# Patient Record
Sex: MALE | Race: WHITE | Employment: FULL TIME | URBAN - NONMETROPOLITAN AREA
[De-identification: names, ages, dates, MRNs, and addresses within clinical notes are randomized per-mention and may not be internally consistent; named-entity substitution may affect disease eponyms.]

---

## 2017-05-16 ENCOUNTER — OFFICE VISIT (OUTPATIENT)
Dept: CARDIOLOGY | Age: 42
End: 2017-05-16

## 2017-05-16 VITALS
SYSTOLIC BLOOD PRESSURE: 140 MMHG | HEIGHT: 71 IN | WEIGHT: 309 LBS | DIASTOLIC BLOOD PRESSURE: 90 MMHG | HEART RATE: 64 BPM | BODY MASS INDEX: 43.26 KG/M2

## 2017-05-16 DIAGNOSIS — R94.31 ABNORMAL ECG: ICD-10-CM

## 2017-05-16 PROCEDURE — 99204 OFFICE O/P NEW MOD 45 MIN: CPT | Performed by: INTERNAL MEDICINE

## 2017-05-16 RX ORDER — LISINOPRIL 5 MG/1
5 TABLET ORAL DAILY
COMMUNITY
End: 2020-07-04

## 2017-05-16 RX ORDER — LISINOPRIL AND HYDROCHLOROTHIAZIDE 25; 20 MG/1; MG/1
1 TABLET ORAL DAILY
Qty: 30 TABLET | Refills: 5 | Status: SHIPPED | OUTPATIENT
Start: 2017-05-16 | End: 2020-07-04

## 2017-05-16 RX ORDER — LISINOPRIL AND HYDROCHLOROTHIAZIDE 25; 20 MG/1; MG/1
1 TABLET ORAL DAILY
COMMUNITY
End: 2017-05-16 | Stop reason: SDUPTHER

## 2017-05-16 ASSESSMENT — ENCOUNTER SYMPTOMS
EYE DISCHARGE: 0
BLOOD IN STOOL: 0
CHOKING: 0
WHEEZING: 0
APNEA: 0
SHORTNESS OF BREATH: 0
NAUSEA: 0
COLOR CHANGE: 0
CHEST TIGHTNESS: 0
COUGH: 0
ABDOMINAL PAIN: 0
VOICE CHANGE: 0

## 2020-07-04 ENCOUNTER — HOSPITAL ENCOUNTER (EMERGENCY)
Age: 45
Discharge: HOME | End: 2020-07-04
Attending: EMERGENCY MEDICINE
Payer: COMMERCIAL

## 2020-07-04 ENCOUNTER — APPOINTMENT (OUTPATIENT)
Dept: CT IMAGING | Age: 45
End: 2020-07-04
Payer: COMMERCIAL

## 2020-07-04 VITALS
RESPIRATION RATE: 17 BRPM | WEIGHT: 300 LBS | DIASTOLIC BLOOD PRESSURE: 79 MMHG | HEART RATE: 64 BPM | BODY MASS INDEX: 40.63 KG/M2 | SYSTOLIC BLOOD PRESSURE: 126 MMHG | OXYGEN SATURATION: 98 % | HEIGHT: 72 IN | TEMPERATURE: 98.7 F

## 2020-07-04 LAB
ALBUMIN SERPL-MCNC: 3.9 G/DL (ref 3.5–5.1)
ALP BLD-CCNC: 43 U/L (ref 38–126)
ALT SERPL-CCNC: 24 U/L (ref 11–66)
ANION GAP SERPL CALCULATED.3IONS-SCNC: 11 MEQ/L (ref 8–16)
AST SERPL-CCNC: 26 U/L (ref 5–40)
BASOPHILS # BLD: 0.9 %
BASOPHILS ABSOLUTE: 0.1 THOU/MM3 (ref 0–0.1)
BILIRUB SERPL-MCNC: 0.8 MG/DL (ref 0.3–1.2)
BILIRUBIN URINE: NEGATIVE
BLOOD, URINE: NEGATIVE
BUN BLDV-MCNC: 18 MG/DL (ref 7–22)
CALCIUM SERPL-MCNC: 8.7 MG/DL (ref 8.5–10.5)
CHARACTER, URINE: CLEAR
CHLORIDE BLD-SCNC: 103 MEQ/L (ref 98–111)
CO2: 24 MEQ/L (ref 23–33)
COLOR: YELLOW
CREAT SERPL-MCNC: 1.1 MG/DL (ref 0.4–1.2)
EKG ATRIAL RATE: 64 BPM
EKG P AXIS: 52 DEGREES
EKG P-R INTERVAL: 134 MS
EKG Q-T INTERVAL: 428 MS
EKG QRS DURATION: 86 MS
EKG QTC CALCULATION (BAZETT): 441 MS
EKG T AXIS: 19 DEGREES
EKG VENTRICULAR RATE: 64 BPM
EOSINOPHIL # BLD: 3.9 %
EOSINOPHILS ABSOLUTE: 0.3 THOU/MM3 (ref 0–0.4)
ERYTHROCYTE [DISTWIDTH] IN BLOOD BY AUTOMATED COUNT: 13.8 % (ref 11.5–14.5)
ERYTHROCYTE [DISTWIDTH] IN BLOOD BY AUTOMATED COUNT: 46.4 FL (ref 35–45)
GFR SERPL CREATININE-BSD FRML MDRD: 72 ML/MIN/1.73M2
GLUCOSE BLD-MCNC: 126 MG/DL (ref 70–108)
GLUCOSE URINE: NEGATIVE MG/DL
HCT VFR BLD CALC: 45.4 % (ref 42–52)
HEMOGLOBIN: 14.5 GM/DL (ref 14–18)
IMMATURE GRANS (ABS): 0.05 THOU/MM3 (ref 0–0.07)
IMMATURE GRANULOCYTES: 0.6 %
KETONES, URINE: NEGATIVE
LEUKOCYTE ESTERASE, URINE: NEGATIVE
LYMPHOCYTES # BLD: 34.5 %
LYMPHOCYTES ABSOLUTE: 2.8 THOU/MM3 (ref 1–4.8)
MCH RBC QN AUTO: 29.4 PG (ref 26–33)
MCHC RBC AUTO-ENTMCNC: 31.9 GM/DL (ref 32.2–35.5)
MCV RBC AUTO: 91.9 FL (ref 80–94)
MONOCYTES # BLD: 11.8 %
MONOCYTES ABSOLUTE: 1 THOU/MM3 (ref 0.4–1.3)
NITRITE, URINE: NEGATIVE
NUCLEATED RED BLOOD CELLS: 0 /100 WBC
OSMOLALITY CALCULATION: 279.1 MOSMOL/KG (ref 275–300)
PH UA: 5 (ref 5–9)
PLATELET # BLD: 256 THOU/MM3 (ref 130–400)
PMV BLD AUTO: 10 FL (ref 9.4–12.4)
POTASSIUM REFLEX MAGNESIUM: 4.3 MEQ/L (ref 3.5–5.2)
PROTEIN UA: NEGATIVE
RBC # BLD: 4.94 MILL/MM3 (ref 4.7–6.1)
SEG NEUTROPHILS: 48.3 %
SEGMENTED NEUTROPHILS ABSOLUTE COUNT: 4 THOU/MM3 (ref 1.8–7.7)
SODIUM BLD-SCNC: 138 MEQ/L (ref 135–145)
SPECIFIC GRAVITY, URINE: 1.03 (ref 1–1.03)
TOTAL PROTEIN: 6.9 G/DL (ref 6.1–8)
TROPONIN T: < 0.01 NG/ML
UROBILINOGEN, URINE: 0.2 EU/DL (ref 0–1)
WBC # BLD: 8.2 THOU/MM3 (ref 4.8–10.8)

## 2020-07-04 PROCEDURE — 93010 ELECTROCARDIOGRAM REPORT: CPT | Performed by: NUCLEAR MEDICINE

## 2020-07-04 PROCEDURE — 36415 COLL VENOUS BLD VENIPUNCTURE: CPT

## 2020-07-04 PROCEDURE — 93005 ELECTROCARDIOGRAM TRACING: CPT | Performed by: EMERGENCY MEDICINE

## 2020-07-04 PROCEDURE — 70450 CT HEAD/BRAIN W/O DYE: CPT

## 2020-07-04 PROCEDURE — 84484 ASSAY OF TROPONIN QUANT: CPT

## 2020-07-04 PROCEDURE — 81003 URINALYSIS AUTO W/O SCOPE: CPT

## 2020-07-04 PROCEDURE — 99285 EMERGENCY DEPT VISIT HI MDM: CPT

## 2020-07-04 PROCEDURE — 80053 COMPREHEN METABOLIC PANEL: CPT

## 2020-07-04 PROCEDURE — 6370000000 HC RX 637 (ALT 250 FOR IP): Performed by: EMERGENCY MEDICINE

## 2020-07-04 PROCEDURE — 85025 COMPLETE CBC W/AUTO DIFF WBC: CPT

## 2020-07-04 RX ORDER — MECLIZINE HYDROCHLORIDE 25 MG/1
25 TABLET ORAL 3 TIMES DAILY PRN
Qty: 20 TABLET | Refills: 0 | Status: SHIPPED | OUTPATIENT
Start: 2020-07-04 | End: 2020-07-14

## 2020-07-04 RX ORDER — MECLIZINE HYDROCHLORIDE CHEWABLE TABLETS 25 MG/1
25 TABLET, CHEWABLE ORAL ONCE
Status: COMPLETED | OUTPATIENT
Start: 2020-07-04 | End: 2020-07-04

## 2020-07-04 RX ADMIN — MECLIZINE HCL 25 MG: 25 TABLET, CHEWABLE ORAL at 06:14

## 2020-07-04 ASSESSMENT — ENCOUNTER SYMPTOMS
COUGH: 0
EYE REDNESS: 0
VOMITING: 0
SORE THROAT: 0
NAUSEA: 0
EYE DISCHARGE: 0
SHORTNESS OF BREATH: 0
EYE ITCHING: 0
CONSTIPATION: 0
EYE PAIN: 0
CHEST TIGHTNESS: 0
ABDOMINAL PAIN: 0
PHOTOPHOBIA: 0
DIARRHEA: 0
STRIDOR: 0
WHEEZING: 0
BACK PAIN: 0
RHINORRHEA: 0
ABDOMINAL DISTENTION: 0

## 2020-07-04 NOTE — ED NOTES
Pt medicated for dizziness per MAR . Pt VSS. Pt denies further needs. Call light in reach, will continue to monitor.       Reji Redd RN  07/04/20 [id number]

## 2020-07-04 NOTE — ED TRIAGE NOTES
Pt to ED via intake with c/o of dizziness that began at 330 this AM. Pt reports they also have a \"discomfort\" in their chest. Pt denies feeling SOB. Pt VSS. Pt is A&Ox4. Pt denies further complaints. EKG completed. Tele applied. IV inserted. Will continue to monitor.

## 2020-07-04 NOTE — ED NOTES
Pt urine collected, labeled and sent lab. Pt traveling to CT at this time.       Maverick Slater RN  07/04/20 0917

## 2020-07-04 NOTE — ED PROVIDER NOTES
[facility] COMPLAINT       Chief Complaint   Patient presents with    Dizziness       Nurses Notes reviewed and I agreeexcept as noted in the HPI. HISTORY OF PRESENT ILLNESS    Jcarlos Calderon is a 50 y.o. male who presents to Emergency Department with Dizziness    69-year-old male with no major past medical history presents to ED complaining of sudden onset dizziness which started from 3 AM in the morning when he woke up. Patient felt whole room was spinning. This never happened before. Symptoms lasted for about 30 minutes and resolved spontaneously. Patient felt normal when he arrived in the ED. He has no headache, no neck pain. He has no fever no chills. No palpitation. No vomiting although he felt mild nausea when that happened. REVIEW OF SYSTEMS     Review of Systems   Constitutional: Negative for activity change, appetite change, chills, fatigue, fever and unexpected weight change. HENT: Negative for congestion, ear discharge, ear pain, hearing loss, nosebleeds, rhinorrhea and sore throat. Eyes: Negative for photophobia, pain, discharge, redness and itching. Respiratory: Negative for cough, chest tightness, shortness of breath, wheezing and stridor. Cardiovascular: Negative for chest pain, palpitations and leg swelling. Gastrointestinal: Negative for abdominal distention, abdominal pain, constipation, diarrhea, nausea and vomiting. Endocrine: Negative for cold intolerance, heat intolerance, polydipsia and polyphagia. Genitourinary: Negative for dysuria, flank pain, frequency and hematuria. Musculoskeletal: Negative for arthralgias, back pain, gait problem, myalgias, neck pain and neck stiffness. Skin: Negative for pallor, rash and wound. Allergic/Immunologic: Negative for environmental allergies and food allergies. Neurological: Positive for dizziness.  Negative for tremors, syncope, weakness and headaches. Psychiatric/Behavioral: Negative for agitation, behavioral problems, confusion, self-injury, sleep disturbance and suicidal ideas. PAST MEDICAL HISTORY    has a past medical history of Hypertension. SURGICAL HISTORY      has no past surgical history on file. CURRENT MEDICATIONS       Previous Medications    No medications on file       ALLERGIES     is allergic to pcn [penicillins]. FAMILY HISTORY     He indicated that the status of his mother is unknown.   family history includes Diabetes in his mother; High Blood Pressure in his mother. SOCIAL HISTORY      reports that he has never smoked. He has quit using smokeless tobacco. He reports that he does not drink alcohol or use drugs. PHYSICAL EXAM     INITIAL VITALS:  height is 6' (1.829 m) and weight is 300 lb (136.1 kg). His oral temperature is 98.7 °F (37.1 °C). His blood pressure is 133/85 and his pulse is 63. His respiration is 18 and oxygen saturation is 98%. Physical Exam  Vitals signs and nursing note reviewed. Constitutional:       Appearance: He is well-developed. He is not diaphoretic. HENT:      Head: Normocephalic and atraumatic. Nose: Nose normal.   Eyes:      General: No scleral icterus. Right eye: No discharge. Left eye: No discharge. Conjunctiva/sclera: Conjunctivae normal.      Pupils: Pupils are equal, round, and reactive to light. Neck:      Musculoskeletal: Normal range of motion and neck supple. Vascular: No JVD. Trachea: No tracheal deviation. Cardiovascular:      Rate and Rhythm: Normal rate and regular rhythm. Heart sounds: Normal heart sounds. No murmur. No friction rub. No gallop. Pulmonary:      Effort: Pulmonary effort is normal. No respiratory distress. Breath sounds: Normal breath sounds. No stridor. No wheezing or rales. Chest:      Chest wall: No tenderness. Abdominal:      General: Bowel sounds are normal. There is no distension. Palpations: Abdomen is soft. There is no mass. Tenderness: There is no abdominal tenderness. There is no guarding or rebound. Hernia: No hernia is present. Musculoskeletal:         General: No tenderness or deformity. Lymphadenopathy:      Cervical: No cervical adenopathy. Skin:     General: Skin is warm and dry. Capillary Refill: Capillary refill takes less than 2 seconds. Coloration: Skin is not pale. Findings: No erythema or rash. Neurological:      Mental Status: He is alert and oriented to person, place, and time. Cranial Nerves: No cranial nerve deficit. Sensory: No sensory deficit. Motor: No abnormal muscle tone. Coordination: Coordination normal.      Deep Tendon Reflexes: Reflexes normal.      Comments: HINTs exam negative. Psychiatric:         Behavior: Behavior normal.         Thought Content: Thought content normal.         Judgment: Judgment normal.           DIFFERENTIAL DIAGNOSIS:   BPV, vertigo, labyrinthitis, TIA    DIAGNOSTIC RESULTS     EKG: All EKG's are interpreted by the Emergency Department Physician who either signs or Co-signsthis chart in the absence of a cardiologist.  Interpreted by me  Normal sinus rhythm  Ventricular rate 64 bpm  CA interval 134 ms  QRS duration 86 ms   ms  Normal EKG    RADIOLOGY: non-plain film images(s) such as CT, Ultrasound and MRI are read by the radiologist.    [address]   Final Result    No evidence of an acute process. **This report has been created using voice recognition software. It may contain minor errors which are inherent in voice recognition technology. **      Final report electronically signed by Dr. Bibi Luo on 7/4/2020 5:27 AM          []Visualized and interpreted by me   [] Radiologist's Wet Read Report Reviewed   [] Discussed with Radiologist.    Nelda Koehler:   Results for orders placed or performed during the hospital encounter of 07/04/20   CBC auto differential Result Value Ref Range    WBC 8.2 4.8 - 10.8 thou/mm3    RBC 4.94 4.70 - 6.10 mill/mm3    Hemoglobin 14.5 14.0 - 18.0 gm/dl    Hematocrit 45.4 42.0 - 52.0 %    MCV 91.9 80.0 - 94.0 fL    MCH 29.4 26.0 - 33.0 pg    MCHC 31.9 (L) 32.2 - 35.5 gm/dl    RDW-CV 13.8 11.5 - 14.5 %    RDW-SD 46.4 (H) 35.0 - 45.0 fL    Platelets 795 744 - 704 thou/mm3    MPV 10.0 9.4 - 12.4 fL    Seg Neutrophils 48.3 %    Lymphocytes 34.5 %    Monocytes 11.8 %    Eosinophils 3.9 %    Basophils 0.9 %    Immature Granulocytes 0.6 %    Segs Absolute 4.0 1.8 - 7.7 thou/mm3    Lymphocytes Absolute 2.8 1.0 - 4.8 thou/mm3    Monocytes Absolute 1.0 0.4 - 1.3 thou/mm3    Eosinophils Absolute 0.3 0.0 - 0.4 thou/mm3    Basophils Absolute 0.1 0.0 - 0.1 thou/mm3    Immature Grans (Abs) 0.05 0.00 - 0.07 thou/mm3    nRBC 0 /100 wbc   Comprehensive Metabolic Panel w/ Reflex to MG   Result Value Ref Range    Glucose 126 (H) 70 - 108 mg/dL    CREATININE 1.1 0.4 - 1.2 mg/dL    BUN 18 7 - 22 mg/dL    Sodium 138 135 - 145 meq/L    Potassium reflex Magnesium 4.3 3.5 - 5.2 meq/L    Chloride 103 98 - 111 meq/L    CO2 24 23 - 33 meq/L    Calcium 8.7 8.5 - 10.5 mg/dL    AST 26 5 - 40 U/L    Alkaline Phosphatase 43 38 - 126 U/L    Total Protein 6.9 6.1 - 8.0 g/dL    Alb 3.9 3.5 - 5.1 g/dL    Total Bilirubin 0.8 0.3 - 1.2 mg/dL    ALT 24 11 - 66 U/L   Urine reflex to culture   Result Value Ref Range    Glucose, Ur NEGATIVE NEGATIVE mg/dl    Bilirubin Urine NEGATIVE NEGATIVE    Ketones, Urine NEGATIVE NEGATIVE    Specific Gravity, Urine 1.027 1.002 - 1.03    Blood, Urine NEGATIVE NEGATIVE    pH, UA 5.0 5.0 - 9.0    Protein, UA NEGATIVE NEGATIVE    Urobilinogen, Urine 0.2 0.0 - 1.0 eu/dl    Nitrite, Urine NEGATIVE NEGATIVE    Leukocyte Esterase, Urine NEGATIVE NEGATIVE    Color, UA YELLOW STRAW-YELL    Character, Urine CLEAR CLEAR-SL C   Troponin   Result Value Ref Range    Troponin T < 0.010 ng/ml   Anion Gap   Result Value Ref Range    Anion Gap 11.0 8.0 - 16.0 meq/L Osmolality   Result Value Ref Range    Osmolality Calc 279.1 275.0 - 300 mOsmol/kg   Glomerular Filtration Rate, Estimated   Result Value Ref Range    Est Gloelmira Filt Rate 72 (A) ml/min/1.73m2   EKG Dizziness   Result Value Ref Range    Ventricular Rate 64 BPM    Atrial Rate 64 BPM    P-R Interval 134 ms    QRS Duration 86 ms    Q-T Interval 428 ms    QTc Calculation (Bazett) 441 ms    P Axis 52 degrees    T Axis 19 degrees       EMERGENCY DEPARTMENT COURSE:   Vitals:    Vitals:    07/04/20 0406 07/04/20 0500 07/04/20 0614   BP:  137/79 133/85   Pulse: 65 67 63   Resp: 17 16 18   Temp: 98.7 °F (37.1 °C)     TempSrc: Oral     SpO2: 97% 99% 98%   Weight: 300 lb (136.1 kg)     Height: 6' (1.829 m)         4:00    Patient is seen and evaluated in a timely fashion. Action:     Large-bore IV, labs, head CT    MedicalDecision Making    Reassessment: Stable. Patient is medicated with following medications during ED stay. Medications   meclizine (ANTIVERT) chewable tablet 25 mg (25 mg Oral Given 7/4/20 0611)       Patient is asymptomatic when he arrived. EKG is normal.  History and exam are consistent with BPPV. I also discussed with him that occasionally vertebrobasilar insufficiency can cause vertigo but usually associated with nausea, swallowing difficulty and gait problem     Given the patient's age, I feel obtaining a head CT is appropriate. Head CT does not reveal acute intra-abdominal findings. Labs are reassuring. EKG is normal.     ED work-ups do not support this is central vertigo. Patient is reassured and discharged with meclizine prescribed. PCP follow-up in 1 week. CRITICAL CARE:   None    CONSULTS:  None    PROCEDURES:  None    FINAL IMPRESSION      1. Dizziness    2.  Benign paroxysmal positional vertigo, unspecified laterality          DISPOSITION/PLAN   Home    PATIENT REFERRED TO:  Arjun Barillas  [address] [place] [id number] [phone]    In 1 week  ED visit follow-up      DISCHARGE MEDICATIONS:  New Prescriptions    MECLIZINE (ANTIVERT) 25 MG TABLET    Take 1 tablet by mouth 3 times daily as needed for Dizziness       (Please note that portions of this note were completed with a voice recognition program.  Efforts were made to edit the dictations but occasionally words aremis-transcribed.)    MD Shyann He MD  07/04/20 8980

## 2021-08-19 ENCOUNTER — HOSPITAL ENCOUNTER (OUTPATIENT)
Age: 46
Setting detail: SPECIMEN
Discharge: HOME | End: 2021-08-19
Payer: COMMERCIAL

## 2021-08-20 LAB
C. TRACHOMATIS DNA ,URINE: NEGATIVE
CULTURE: NORMAL
Lab: NORMAL
N. GONORRHOEAE DNA, URINE: ABNORMAL
SOURCE: NORMAL
SPECIMEN DESCRIPTION: ABNORMAL
SPECIMEN DESCRIPTION: NORMAL
TRICHOMONAS VAGINALI, MOLECULAR: NEGATIVE

## 2022-10-26 ENCOUNTER — TELEPHONE (OUTPATIENT)
Dept: FAMILY MEDICINE CLINIC | Age: 47
End: 2022-10-26

## 2022-10-26 NOTE — TELEPHONE ENCOUNTER
Alexandro asking if you will take him on as a new patient along with his wife and 2 children. Please advise for a call back to wife Amanda.

## 2023-02-14 ENCOUNTER — HOSPITAL ENCOUNTER (EMERGENCY)
Age: 48
Discharge: HOME | End: 2023-02-14
Payer: COMMERCIAL

## 2023-02-14 ENCOUNTER — APPOINTMENT (OUTPATIENT)
Dept: INTERVENTIONAL RADIOLOGY/VASCULAR | Age: 48
End: 2023-02-14
Payer: COMMERCIAL

## 2023-02-14 VITALS
DIASTOLIC BLOOD PRESSURE: 93 MMHG | WEIGHT: 300 LBS | BODY MASS INDEX: 40.69 KG/M2 | HEART RATE: 72 BPM | SYSTOLIC BLOOD PRESSURE: 168 MMHG | RESPIRATION RATE: 17 BRPM | OXYGEN SATURATION: 96 % | TEMPERATURE: 97.5 F

## 2023-02-14 DIAGNOSIS — M79.604 RIGHT LEG PAIN: Primary | ICD-10-CM

## 2023-02-14 PROCEDURE — 93971 EXTREMITY STUDY: CPT

## 2023-02-14 PROCEDURE — 99284 EMERGENCY DEPT VISIT MOD MDM: CPT

## 2023-02-14 ASSESSMENT — PAIN DESCRIPTION - LOCATION: LOCATION: LEG

## 2023-02-14 ASSESSMENT — PAIN DESCRIPTION - PAIN TYPE: TYPE: ACUTE PAIN

## 2023-02-14 ASSESSMENT — PAIN - FUNCTIONAL ASSESSMENT: PAIN_FUNCTIONAL_ASSESSMENT: 0-10

## 2023-02-14 ASSESSMENT — PAIN SCALES - GENERAL: PAINLEVEL_OUTOF10: 7

## 2023-02-14 ASSESSMENT — PAIN DESCRIPTION - ORIENTATION: ORIENTATION: RIGHT

## 2023-02-14 NOTE — ED TRIAGE NOTES
Patient presents to ED with chief complaint of right lower leg pain for 2x weeks ([facility]). Patient resting in bed. Respirations easy and unlabored. No distress noted. Call light within reach.

## 2023-02-15 ASSESSMENT — ENCOUNTER SYMPTOMS
BACK PAIN: 0
NAUSEA: 0
RHINORRHEA: 0
SHORTNESS OF BREATH: 0
PHOTOPHOBIA: 0
VOMITING: 0

## 2023-02-15 NOTE — ED PROVIDER NOTES
[facility] [address] [address] EMERGENCY DEPT      Pt Name: Maria Isabel Harris  MRN: [id number]  [place] 1975  Date of evaluation: 2/14/2023  Provider: Carmen Payton PA-C    CHIEF COMPLAINT       Chief Complaint   Patient presents with    Leg Pain     Right lower       Nurses Notes reviewed and I agree except as noted in the HPI. HISTORY OF PRESENT ILLNESS    Maria Isabel Harris is a 50 y.o. male who presents for right lower extremity pain. Patient reports for the past 2 weeks or so he has had pain below his knee down to the upper 1/3 tib-fib area. It is a little worse posteriorly. He only has the pain with walking and weightbearing and it resolves if he sits. Patient does not recall any injury but he does walk up and down the steps all day for work. Mr. Karrie Morrison has taken no medications for pain. Patient denies numbness, fever, nausea, vomiting, weakness, or other complaints. Patient was concerned for a blood clot. Patient takes Januvia and a diabetes pill as well as meloxicam for osteoarthritis. REVIEW OF SYSTEMS     Review of Systems   Constitutional:  Negative for diaphoresis and fever. HENT:  Negative for rhinorrhea. Eyes:  Negative for photophobia. Respiratory:  Negative for shortness of breath. Cardiovascular:  Negative for chest pain. Gastrointestinal:  Negative for nausea and vomiting. Musculoskeletal:  Positive for myalgias. Negative for back pain and gait problem. Skin:  Negative for rash and wound. Neurological:  Negative for weakness and numbness. Psychiatric/Behavioral:  Negative for confusion. PAST MEDICAL HISTORY    has a past medical history of Hypertension. SURGICAL HISTORY      has no past surgical history on file. CURRENT MEDICATIONS       There are no discharge medications for this patient. ALLERGIES     is allergic to pcn [penicillins].     FAMILY HISTORY     He indicated that the status of his mother is unknown.   family history includes Diabetes in his mother; High Blood Pressure in his mother. SOCIAL HISTORY    reports that he has never smoked. He has quit using smokeless tobacco. He reports that he does not drink alcohol and does not use drugs. PHYSICAL EXAM     INITIAL VITALS:  weight is 300 lb (136.1 kg). His temperature is 97.5 °F (36.4 °C). His blood pressure is 168/93 (abnormal) and his pulse is 72. His respiration is 17 and oxygen saturation is 96%. Physical Exam  Constitutional:       General: He is not in acute distress. Appearance: He is well-developed. He is not toxic-appearing. HENT:      Head: Normocephalic and atraumatic. Right Ear: Hearing normal.      Left Ear: Hearing normal.      Nose: Nose normal. No nasal deformity. Mouth/Throat:      Mouth: Mucous membranes are moist.      Pharynx: Oropharynx is clear. Eyes:      General: Lids are normal.      Extraocular Movements: Extraocular movements intact. Conjunctiva/sclera: Conjunctivae normal.   Neck:      Trachea: Trachea normal. No tracheal deviation. Cardiovascular:      Rate and Rhythm: Normal rate and regular rhythm. Pulses:           Dorsalis pedis pulses are 2+ on the right side and 2+ on the left side. Posterior tibial pulses are 2+ on the right side and 2+ on the left side. Pulmonary:      Effort: Pulmonary effort is normal. No tachypnea. Abdominal:      General: There is no distension. Palpations: Abdomen is soft. Musculoskeletal:      Cervical back: No rigidity. Right knee: Normal.      Right lower leg: Normal.   Skin:     General: Skin is warm and dry. Capillary Refill: Capillary refill takes less than 2 seconds. Coloration: Skin is not pale. Findings: No rash. Neurological:      Mental Status: He is alert. GCS: GCS eye subscore is 4. GCS verbal subscore is 5. GCS motor subscore is 6. Sensory: No sensory deficit. Motor: No weakness.       Coordination: Coordination normal.      Gait: Gait normal.   Psychiatric:         Speech: Speech normal.         Behavior: Behavior normal. Behavior is cooperative.         Thought Content: Thought content normal.       DIFFERENTIAL DIAGNOSIS:   Including but not limited to: Bursitis, Baker's cyst, strain, osteoarthritis flare    Diagnoses Considered but I have low suspicion of:   Fracture, DVT    DIAGNOSTIC RESULTS     EKG: All EKG's are interpreted by [facility] Phys[facility] who either signs or Co-signs this chart in the absence of a cardiologist.  None    RADIOLOGY: non-plain film images(s) such as CT,Ultrasound and MRI are read by the radiologist.  Plain radiographic images are visualized and preliminarily interpreted by the emergency physician unless otherwise stated below.  VL DUP LOWER EXTREMITY VENOUS RIGHT   Final Result   No evidence of a DVT.               **This report has been created using voice recognition software. It may contain minor errors which are inherent in voice recognition technology.**      Final report electronically signed by Dr. Surinder Palomino on 2/14/2023 8:14 PM          LABS:   Labs Reviewed - No data to display    EMERGENCY DEPARTMENT COURSE:   Vitals:    Vitals:    02/14/23 1826   BP: (!) 168/93   Pulse: 72   Resp: 17   Temp: 97.5 °F (36.4 °C)   SpO2: 96%   Weight: 300 lb (136.1 kg)       MDM:  The patient was seen and evaluated by me in the intake area for right lower extremity pain. Vital signs were reviewed and noted stable. Physical exam revealed similarly sized legs within normal right knee and lower leg exam.  The patient was neurovascularly intact.  We discussed potential work-up including x-rays and an ultrasound.  The patient declined x-rays.  He was aware he has osteoarthritis and had x-rays at [facility] last year.  Patient was amenable however to have a DVT study.  Appropriate testing was ordered. Results were reviewed by me upon completion. Results showed no DVT. Results were discussed with the patient and discharge plan was  discussed. Ace wrap applied. Patient declined medication while here. We discussed pain treatment at home. Aspiratory guidance given and patient was comfortable plan of care. He would follow-up at [facility]. I have given the patient strict written and verbal instructions about care at home, follow-up, and signs and symptoms of worsening of condition and they did verbalize understanding. CRITICAL CARE:   None    CONSULTS:  None    PROCEDURES:  None    FINAL IMPRESSION      1. Right leg pain          DISPOSITION/PLAN     1. Right leg pain        PATIENT REFERRED TO:  No follow-up provider specified. DISCHARGE MEDICATIONS:  There are no discharge medications for this patient.       (Please note that portions of this note were completed with a voice recognition program.  Efforts were made to edit the dictations but occasionally words are mis-transcribed.)    Phi Nance PA-C 02/15/23 3:26 AM    OLLIE Valenzuela PA-C  02/15/23 [id number]

## 2025-02-05 ENCOUNTER — HOSPITAL ENCOUNTER (EMERGENCY)
Age: 50
Discharge: HOME | End: 2025-02-05
Payer: COMMERCIAL

## 2025-02-05 VITALS
HEART RATE: 79 BPM | RESPIRATION RATE: 16 BRPM | OXYGEN SATURATION: 95 % | DIASTOLIC BLOOD PRESSURE: 101 MMHG | TEMPERATURE: 97.7 F | SYSTOLIC BLOOD PRESSURE: 160 MMHG

## 2025-02-05 DIAGNOSIS — J11.1 INFLUENZA-LIKE ILLNESS: Primary | ICD-10-CM

## 2025-02-05 PROCEDURE — 99213 OFFICE O/P EST LOW 20 MIN: CPT

## 2025-02-05 PROCEDURE — 99212 OFFICE O/P EST SF 10 MIN: CPT

## 2025-02-05 ASSESSMENT — ENCOUNTER SYMPTOMS
GASTROINTESTINAL NEGATIVE: 1
COUGH: 1
SORE THROAT: 1
EYES NEGATIVE: 1

## 2025-02-05 NOTE — DISCHARGE INSTRUCTIONS
Rest.  Tylenol Motrin as needed for pain or fever.  Drink plenty of fluids.  You will be given a PCR COVID flu test you can check your MyChart for results in the morning.  Follow-up with primary care provider for any new or worsening symptoms.  Go to the emergency department for any other symptoms or concerns deemed emergent.

## 2025-02-05 NOTE — ED NOTES
To [facility] with complaints of sore throat, cough, congestion, headache. Started today.      Elva Escobedo, AMY  02/05/25 [id number]

## 2025-02-05 NOTE — ED PROVIDER NOTES
[facility] URGENT CARE  UrgentCare Encounter      CHIEFCOMPLAINT       Chief Complaint   Patient presents with    Congestion    Pharyngitis    Cough    Headache       Nurses Notes reviewed and I agree except as noted in the HPI.  HISTORY OF PRESENT ILLNESS   Alexandro Mark is a 49 y.o. male who presents with cough congestion and headache that started today.  States his daughter has been sick for several days.    REVIEW OF SYSTEMS     Review of Systems   Constitutional: Negative.    HENT:  Positive for congestion and sore throat.    Eyes: Negative.    Respiratory:  Positive for cough.    Cardiovascular: Negative.    Gastrointestinal: Negative.    Neurological:  Positive for headaches.       PAST MEDICAL HISTORY         Diagnosis Date    Hypertension        SURGICAL HISTORY     Patient  has no past surgical history on file.    CURRENT MEDICATIONS       Previous Medications    No medications on file       ALLERGIES     Patient is is allergic to pcn [penicillins].    FAMILY HISTORY     Patient'sfamily history includes Diabetes in his mother; High Blood Pressure in his mother.    SOCIAL HISTORY     Patient  reports that he has never smoked. He has quit using smokeless tobacco. He reports that he does not drink alcohol and does not use drugs.    PHYSICAL EXAM     ED TRIAGE VITALS  BP: (!) 160/101, Temp: 97.7 °F (36.5 °C), Pulse: 79, Respirations: 16, SpO2: 95 %  Physical Exam  HENT:      Right Ear: Tympanic membrane normal.      Left Ear: Tympanic membrane normal.      Nose: No congestion.   Eyes:      Conjunctiva/sclera: Conjunctivae normal.   Cardiovascular:      Rate and Rhythm: Normal rate and regular rhythm.      Pulses: Normal pulses.      Heart sounds: Normal heart sounds.   Pulmonary:      Effort: Pulmonary effort is normal.      Breath sounds: Normal breath sounds.   Abdominal:      General: Bowel sounds are normal.      Palpations: Abdomen is soft.   Musculoskeletal:         General: Normal range of